# Patient Record
Sex: MALE | Race: WHITE | NOT HISPANIC OR LATINO | URBAN - METROPOLITAN AREA
[De-identification: names, ages, dates, MRNs, and addresses within clinical notes are randomized per-mention and may not be internally consistent; named-entity substitution may affect disease eponyms.]

---

## 2022-04-14 ENCOUNTER — HOSPITAL ENCOUNTER (EMERGENCY)
Facility: OTHER | Age: 22
Discharge: HOME | End: 2022-04-14
Attending: EMERGENCY MEDICINE
Payer: COMMERCIAL

## 2022-04-14 VITALS
TEMPERATURE: 98 F | WEIGHT: 155 LBS | HEART RATE: 75 BPM | RESPIRATION RATE: 18 BRPM | HEIGHT: 68 IN | BODY MASS INDEX: 23.49 KG/M2 | DIASTOLIC BLOOD PRESSURE: 55 MMHG | SYSTOLIC BLOOD PRESSURE: 115 MMHG | OXYGEN SATURATION: 98 %

## 2022-04-14 DIAGNOSIS — L03.012 CELLULITIS OF FINGER OF LEFT HAND: Primary | ICD-10-CM

## 2022-04-14 PROCEDURE — 99284 EMERGENCY DEPT VISIT MOD MDM: CPT | Mod: 25

## 2022-04-14 PROCEDURE — 25000003 PHARM REV CODE 250: Performed by: PHYSICIAN ASSISTANT

## 2022-04-14 RX ORDER — PROPRANOLOL HYDROCHLORIDE 20 MG/1
20 TABLET ORAL
COMMUNITY
Start: 2021-09-10

## 2022-04-14 RX ORDER — AMOXICILLIN AND CLAVULANATE POTASSIUM 875; 125 MG/1; MG/1
1 TABLET, FILM COATED ORAL 2 TIMES DAILY
Qty: 14 TABLET | Refills: 0 | Status: SHIPPED | OUTPATIENT
Start: 2022-04-14 | End: 2022-04-21

## 2022-04-14 RX ORDER — KETOROLAC TROMETHAMINE 10 MG/1
10 TABLET, FILM COATED ORAL
Status: COMPLETED | OUTPATIENT
Start: 2022-04-14 | End: 2022-04-14

## 2022-04-14 RX ORDER — FINASTERIDE 1 MG/1
1 TABLET, FILM COATED ORAL DAILY
COMMUNITY
Start: 2022-02-14

## 2022-04-14 RX ORDER — AMOXICILLIN AND CLAVULANATE POTASSIUM 875; 125 MG/1; MG/1
1 TABLET, FILM COATED ORAL
Status: COMPLETED | OUTPATIENT
Start: 2022-04-14 | End: 2022-04-14

## 2022-04-14 RX ORDER — ZOLPIDEM TARTRATE 10 MG/1
10 TABLET ORAL NIGHTLY PRN
COMMUNITY
Start: 2022-03-02

## 2022-04-14 RX ORDER — SERTRALINE HYDROCHLORIDE 50 MG/1
50 TABLET, FILM COATED ORAL
COMMUNITY
Start: 2021-09-10

## 2022-04-14 RX ORDER — KETOROLAC TROMETHAMINE 10 MG/1
10 TABLET, FILM COATED ORAL EVERY 6 HOURS
Qty: 12 TABLET | Refills: 0 | Status: SHIPPED | OUTPATIENT
Start: 2022-04-14 | End: 2022-04-17

## 2022-04-14 RX ORDER — SERTRALINE HYDROCHLORIDE 100 MG/1
TABLET, FILM COATED ORAL
COMMUNITY
Start: 2022-03-02

## 2022-04-14 RX ADMIN — AMOXICILLIN AND CLAVULANATE POTASSIUM 1 TABLET: 875; 125 TABLET, FILM COATED ORAL at 07:04

## 2022-04-14 RX ADMIN — KETOROLAC TROMETHAMINE 10 MG: 10 TABLET, FILM COATED ORAL at 07:04

## 2022-04-14 NOTE — Clinical Note
"Ramses"Alyx Luz was seen and treated in our emergency department on 4/14/2022.  He may return to school on 04/16/2022.      If you have any questions or concerns, please don't hesitate to call.      STAR Valentino"

## 2022-04-15 NOTE — ED TRIAGE NOTES
Patient presents to ED with c/o hand swelling and redness after a cat bite 2 days ago. Denies fever.

## 2022-04-15 NOTE — ED PROVIDER NOTES
Source of History:  Patient     Chief complaint:  cat bite (Pt c.o cat bite to left hand onset 2 days ago. Pt states he owns the cat and had shots. Pt us UTD with tetanus. AAO x 3  nadn skin w.d  pt has what appears to be a bite with scratches to top left hand with redness and swelling noted. Redness from hand to wrist. Pt  left hand rednss and swelling has improved from yesterday.  + left radial pulse noted. )      HPI:  Ramses Luz is a 21 y.o. male with no significant past medical history presents the ED for evaluation of left hand pain and swelling.  Patient states that he was handling his friend's cat who was known to be up-to-date with shots when he sustained scratches in bite to the left hand and right forearm.  He states since this time he has noted increased pain and swelling to the left hand with prevalence to the left dorsum.  He does report some modest improvement pain and redness since initiating cleaning and bacitracin today.  He denies any drainage from the site, decreased range of motion, decreased strength, fever, chills or additional complaints.  He is up-to-date with his tetanus.  He has not tried any medications for the symptoms    This is the extent to the patients complaints today here in the emergency department.    ROS: As per HPI and below:  Constitutional: No fever.  No chills.  Eyes: No visual changes.   ENT: No sore throat. No ear pain.  Urinary: No abnormal urination.  MSK:  Left hand pain and swelling  Integument:  Multiple scratches, puncture wound noted to left hand    Review of patient's allergies indicates:  No Known Allergies    PMH:  As per HPI and below:  History reviewed. No pertinent past medical history.  History reviewed. No pertinent surgical history.    Social History     Tobacco Use    Smoking status: Never Smoker    Smokeless tobacco: Never Used   Substance Use Topics    Alcohol use: Never    Drug use: Never       Physical Exam:    BP (!) 115/55 (BP Location:  "Left arm, Patient Position: Sitting)   Pulse 75   Temp 97.9 °F (36.6 °C) (Oral)   Resp 18   Ht 5' 8" (1.727 m)   Wt 70.3 kg (155 lb)   SpO2 98%   BMI 23.57 kg/m²   Nursing note and vital signs reviewed.  Constitutional: No acute distress.  Eyes: No conjunctival injection.  Extraocular muscles are intact.  ENT: Normal phonation.  Musculoskeletal:  Full range of motion of all extremities including affected bilateral upper extremities.  Noted erythema to the left dorsum that extends modestly into the wrist.  No significant tenderness palpation.  No fluctuance.  No abscess.  No drainage from any sites.  No erythema or edema on the volar aspect.  See picture for further evaluation  Skin:  Erythema,  No rashes seen.  Good turgor.  + abrasions.  No ecchymoses.  Psych: Appropriate, conversant.            I decided to obtain the patient's medical records.    No results found for this or any previous visit.  Imaging Results          X-Ray Hand 3 View Left (Final result)  Result time 04/14/22 20:18:09   Procedure changed from X-Ray Hand 1 View Left     Final result by Casa Ruiz MD (04/14/22 20:18:09)                 Impression:      1. No acute displaced fracture or dislocation of the hand noting nonspecific dorsal edema.      Electronically signed by: Casa Ruiz MD  Date:    04/14/2022  Time:    20:18             Narrative:    EXAMINATION:  XR HAND COMPLETE 3 VIEW LEFT    CLINICAL HISTORY:  cat bite;.    TECHNIQUE:  PA, lateral, and oblique views of the left hand were performed.    COMPARISON:  None    FINDINGS:  Three views left hand.    No acute displaced fracture or dislocation of the hand.  No radiopaque foreign body.  There is edema about the dorsal aspect of the hand.                                MDM:    21 y.o. male with skin eruption.  Physical exam reveals patient in no obvious distress Full range of motion of all extremities including affected bilateral upper extremities.  Noted erythema to " the left dorsum that extends modestly into the wrist.  No significant tenderness palpation.  No fluctuance.  No abscess.  No drainage from any sites.  No erythema or edema on the volar aspect.  See picture for further evaluation. FROM of all joints on affected extremities, sensation and capillary refill intact.     DDX: abscess, cellulitis, folliculitis,     ED management:  Left discuss need for initiation of antibiotics.  Discussed need to initiate oral Augmentin although did give strict return precautions for any new or worsening within the next few days given the overall appearance.  Encouraged continued home wound care and cleaning.  Low suspicion for deep space infection patient appears overall well with no fever and no clinical findings to suggest systemic infection. We will send patient home with ABX and NSAID'S with instructions on use.  He did report some improvement in the ED after receiving his 1st dose      Impression/Plan: The encounter diagnosis was Cellulitis of finger of left hand.  Patient will follow up with Primary or this ED for wound check in 2-3 days.  Patient cautioned on when to return to ED.  Pt. Understands and agrees with current treatment plan                   Diagnostic Impression:    1. Cellulitis of finger of left hand         ED Disposition Condition    Discharge Stable          ED Prescriptions     Medication Sig Dispense Start Date End Date Auth. Provider    amoxicillin-clavulanate 875-125mg (AUGMENTIN) 875-125 mg per tablet Take 1 tablet by mouth 2 (two) times daily. for 7 days 14 tablet 4/14/2022 4/21/2022 STAR Valentino    ketorolac (TORADOL) 10 mg tablet Take 1 tablet (10 mg total) by mouth every 6 (six) hours. for 3 days 12 tablet 4/14/2022 4/17/2022 STAR Valentino        Follow-up Information     Follow up With Specialties Details Why Contact Info    [facility] Emergency Dept Emergency Medicine In 2 days For wound re-check, If symptoms worsen [address]  [facility] [id number]  [phone]           STAR Valentino  04/14/22 2039

## 2022-05-09 ENCOUNTER — HOSPITAL ENCOUNTER (EMERGENCY)
Facility: OTHER | Age: 22
Discharge: HOME | End: 2022-05-09
Attending: EMERGENCY MEDICINE
Payer: COMMERCIAL

## 2022-05-09 VITALS
OXYGEN SATURATION: 100 % | DIASTOLIC BLOOD PRESSURE: 77 MMHG | HEIGHT: 68 IN | BODY MASS INDEX: 24.25 KG/M2 | WEIGHT: 160 LBS | SYSTOLIC BLOOD PRESSURE: 116 MMHG | RESPIRATION RATE: 18 BRPM | HEART RATE: 90 BPM | TEMPERATURE: 98 F

## 2022-05-09 DIAGNOSIS — T78.40XA ALLERGIC REACTION, INITIAL ENCOUNTER: Primary | ICD-10-CM

## 2022-05-09 PROCEDURE — 25000003 PHARM REV CODE 250: Performed by: EMERGENCY MEDICINE

## 2022-05-09 PROCEDURE — 96374 THER/PROPH/DIAG INJ IV PUSH: CPT

## 2022-05-09 PROCEDURE — 63600175 PHARM REV CODE 636 W HCPCS: Performed by: EMERGENCY MEDICINE

## 2022-05-09 PROCEDURE — 99284 EMERGENCY DEPT VISIT MOD MDM: CPT | Mod: 25

## 2022-05-09 PROCEDURE — 96375 TX/PRO/DX INJ NEW DRUG ADDON: CPT

## 2022-05-09 RX ORDER — DIPHENHYDRAMINE HYDROCHLORIDE 50 MG/ML
25 INJECTION INTRAMUSCULAR; INTRAVENOUS
Status: COMPLETED | OUTPATIENT
Start: 2022-05-09 | End: 2022-05-09

## 2022-05-09 RX ORDER — PREDNISONE 20 MG/1
60 TABLET ORAL
Status: COMPLETED | OUTPATIENT
Start: 2022-05-09 | End: 2022-05-09

## 2022-05-09 RX ORDER — FAMOTIDINE 10 MG/ML
20 INJECTION INTRAVENOUS
Status: COMPLETED | OUTPATIENT
Start: 2022-05-09 | End: 2022-05-09

## 2022-05-09 RX ORDER — PREDNISONE 10 MG/1
10 TABLET ORAL SEE ADMIN INSTRUCTIONS
Qty: 21 TABLET | Refills: 0 | Status: SHIPPED | OUTPATIENT
Start: 2022-05-09 | End: 2022-05-19

## 2022-05-09 RX ADMIN — DIPHENHYDRAMINE HYDROCHLORIDE 25 MG: 50 INJECTION, SOLUTION INTRAMUSCULAR; INTRAVENOUS at 03:05

## 2022-05-09 RX ADMIN — FAMOTIDINE 20 MG: 10 INJECTION INTRAVENOUS at 03:05

## 2022-05-09 RX ADMIN — PREDNISONE 60 MG: 20 TABLET ORAL at 03:05

## 2022-05-09 NOTE — ED NOTES
Pt to ER with c/o general rash x 2 days getting worse. Pt unsure of what hes allergic to but reports he started with a new strawberry body wash 5 days ago. Pt noted with horse voice. Pt also reports joint pain to elbows, hips and knees. + edema to bilateral hands. IV started and labs drawn. Pt hooked up to monitor and handed call light with instructions to call if symptoms change or worsen. Pt verbalized understanding.

## 2022-05-09 NOTE — ED NOTES
Pt given and educated on discharge paperwork, Rx, and follow up plan of care. Pt thoroughly educated on signs and symptoms to come back to ER.  Pt verbalized understanding. IV removed. Pt ambulated off unit in no distress.

## 2022-05-09 NOTE — ED PROVIDER NOTES
Encounter Date: 5/9/2022    SCRIBE #1 NOTE: I, Zaria Thurston, am scribing for, and in the presence of, Hubert Adame II, MD.       History     Chief Complaint   Patient presents with    Allergic Reaction     Rash and hives all over. Unknown allergen. Started 2 days ago     Time seen by provider: 3:25 PM    This is a 22 y.o. male who presents with diffuse rash covering his body x 2 days. Patient reports that he ate strawberry and cream cake as well as [place]ones[place] food 2 nights ago prior to rash onset. He states that he has no known allergies and has never experienced a similar rash in the past. He denies any difficulty breathing or trouble swallowing. He denies any recent illness such as fever, chills, cough, congestion, rhinorrhea. He has no significant PMHx. He has not recently started taking any new medications. Patient takes Finasteride daily, but this is not a new medication. He notes that he is from [place] and his PCP is back home. This is the extent of the patient's complaints at this time.    The history is provided by the patient.     Review of patient's allergies indicates:  No Known Allergies  No past medical history on file.  No past surgical history on file.  No family history on file.  Social History     Tobacco Use    Smoking status: Never Smoker    Smokeless tobacco: Never Used   Substance Use Topics    Alcohol use: Never    Drug use: Never     Review of Systems   Constitutional: Negative for chills and fever.   HENT: Negative for congestion, rhinorrhea, sore throat and trouble swallowing.    Respiratory: Negative for cough and shortness of breath.    Cardiovascular: Negative for chest pain.   Gastrointestinal: Negative for nausea.   Genitourinary: Negative for dysuria.   Musculoskeletal: Negative for back pain.   Skin: Positive for rash.   Neurological: Negative for weakness.   Hematological: Does not bruise/bleed easily.       Physical Exam     Initial Vitals   BP Pulse Resp Temp SpO2    05/09/22 1437 05/09/22 1437 05/09/22 1437 05/09/22 1437 05/09/22 1630   103/68 108 (!) 22 98.3 °F (36.8 °C) 100 %      MAP       --                Physical Exam    Nursing note and vitals reviewed.  Constitutional: He appears well-developed and well-nourished. He is not diaphoretic. No distress.   HENT:   Head: Normocephalic and atraumatic.   No intraoral lesions. No edema of the tongue or oropharynx. No stridor or hoarseness.    Eyes: EOM are normal.   Neck: Neck supple.   Normal range of motion.  Cardiovascular: Normal rate, regular rhythm and normal heart sounds. Exam reveals no gallop and no friction rub.    No murmur heard.  Pulmonary/Chest: Breath sounds normal. No respiratory distress. He has no wheezes. He has no rhonchi. He has no rales.   Musculoskeletal:         General: No tenderness or edema. Normal range of motion.      Cervical back: Normal range of motion and neck supple.     Neurological: He is alert and oriented to person, place, and time.   Skin: Skin is warm and dry.   Diffuse hives over the face, torso, and extremities. No other acute rash.         ED Course   Procedures  Labs Reviewed - No data to display       Imaging Results    None          Medications   diphenhydrAMINE injection 25 mg (25 mg Intravenous Given 5/9/22 1558)   famotidine (PF) injection 20 mg (20 mg Intravenous Given 5/9/22 1558)   predniSONE tablet 60 mg (60 mg Oral Given 5/9/22 1557)     Medical Decision Making:   History:   Old Medical Records: I decided to obtain old medical records.  Patient presents with rash x2 days.  Reports it started after eating various foods including strawberries, but had not had prior reaction to them before.  No respiratory or airway symptoms.  On exam patient has diffuse hives but otherwise well-appearing.  Had taken oral Benadryl home.  Given IV Pepcid and Benadryl here and started on prednisone.  No progression of symptoms and the patient feels like the rash is starting to improve.  Will  discharge on course of tapering prednisone.  Return precautions discussed.  Encouraged follow-up with allergist either here or at home in [place].        Scribe Attestation:   Scribe #1: I performed the above scribed service and the documentation accurately describes the services I performed. I attest to the accuracy of the note.              Physician Attestation for Scribe: I, LULU, reviewed documentation as scribed in my presence, which is both accurate and complete.    Clinical Impression:   Final diagnoses:  [T78.40XA] Allergic reaction, initial encounter (Primary)          ED Disposition Condition    Discharge Stable        ED Prescriptions     Medication Sig Dispense Start Date End Date Auth. Provider    predniSONE (DELTASONE) 10 MG tablet Take 1 tablet (10 mg total) by mouth As instructed. Take 4 tabs x 3 days, then  Take 2 tabs x 3 days, then   Take 1 tab x 3 days. 21 tablet 5/9/2022 5/19/2022 Hubert Adame II, MD        Follow-up Information     Follow up With Specialties Details Why Contact Info    Your Primary Care Physician and/or Allergy clinic upon return to [place]  Schedule an appointment as soon as possible for a visit       [facility] ALLERGY Allergy Schedule an appointment as soon as possible for a visit   [address] [place] [id number]  [phone]           Hubert Adame II, MD  05/10/22 4900